# Patient Record
Sex: MALE | NOT HISPANIC OR LATINO | Employment: FULL TIME | URBAN - METROPOLITAN AREA
[De-identification: names, ages, dates, MRNs, and addresses within clinical notes are randomized per-mention and may not be internally consistent; named-entity substitution may affect disease eponyms.]

---

## 2023-02-16 ENCOUNTER — TELEMEDICINE (OUTPATIENT)
Dept: FAMILY MEDICINE CLINIC | Facility: TELEHEALTH | Age: 28
End: 2023-02-16
Payer: COMMERCIAL

## 2023-02-16 VITALS — TEMPERATURE: 98.2 F

## 2023-02-16 DIAGNOSIS — J22 LOWER RESPIRATORY INFECTION (E.G., BRONCHITIS, PNEUMONIA, PNEUMONITIS, PULMONITIS): Primary | ICD-10-CM

## 2023-02-16 PROBLEM — R29.898 LOWER EXTREMITY WEAKNESS: Status: ACTIVE | Noted: 2021-02-03

## 2023-02-16 PROBLEM — S69.91XA INJURY OF RIGHT WRIST: Status: ACTIVE | Noted: 2018-01-24

## 2023-02-16 PROCEDURE — 99203 OFFICE O/P NEW LOW 30 MIN: CPT | Performed by: NURSE PRACTITIONER

## 2023-02-16 RX ORDER — AZITHROMYCIN 250 MG/1
TABLET, FILM COATED ORAL
Qty: 6 TABLET | Refills: 0 | Status: SHIPPED | OUTPATIENT
Start: 2023-02-16

## 2023-02-16 RX ORDER — ALBUTEROL SULFATE 90 UG/1
2 AEROSOL, METERED RESPIRATORY (INHALATION) EVERY 4 HOURS PRN
Qty: 18 G | Refills: 0 | Status: SHIPPED | OUTPATIENT
Start: 2023-02-16 | End: 2023-02-26

## 2023-02-16 RX ORDER — BROMPHENIRAMINE MALEATE, PSEUDOEPHEDRINE HYDROCHLORIDE, AND DEXTROMETHORPHAN HYDROBROMIDE 2; 30; 10 MG/5ML; MG/5ML; MG/5ML
5 SYRUP ORAL 4 TIMES DAILY PRN
Qty: 140 ML | Refills: 0 | Status: SHIPPED | OUTPATIENT
Start: 2023-02-16 | End: 2023-02-23

## 2023-02-17 NOTE — PROGRESS NOTES
You have chosen to receive care through a telehealth visit.  Do you consent to use a video/audio connection for your medical care today? Yes     CHIEF COMPLAINT  Chief Complaint   Patient presents with   • Cough         HPI  Juan C Eason is a 28 y.o. male  presents with complaint of cough and mild SOA. Reports he has a history of chronic bronchitis in the past. Reports he needs a refill of his inhaler since the one he has that is out of date. No fever or chills. No nausea or vomiting. Mild SOA no CP. Reports he is coughing up some yellow secretions. Reports he has taken mucinex for his symptoms. Reports his symptoms started 1.5 week ago.     Review of Systems   Constitutional: Negative for chills, fatigue and fever.   HENT: Positive for congestion. Negative for ear discharge, ear pain, sinus pressure, sinus pain and sore throat.    Respiratory: Positive for cough and shortness of breath. Negative for chest tightness and wheezing.         Mild SOA   Cardiovascular: Negative for chest pain.   Gastrointestinal: Negative for abdominal pain, diarrhea, nausea and vomiting.   Musculoskeletal: Negative for back pain and myalgias.   Neurological: Negative for dizziness and headaches.   Psychiatric/Behavioral: Negative.        Past Medical History:   Diagnosis Date   • GERD (gastroesophageal reflux disease)    • Hypertension        No family history on file.    Social History     Socioeconomic History   • Marital status:    Tobacco Use   • Smoking status: Never   • Smokeless tobacco: Current     Types: Chew       Juan C Eason  reports that he has never smoked. His smokeless tobacco use includes chew.. I have educated him on the risk of diseases from using tobacco products such as cancer, COPD and heart disease.     Reports he is going to try to stop chewing tobacco. Reports he will discuss with his PCP     I spent 1 minutes counseling the patient.              Temp 98.2 °F (36.8 °C) (Oral)     PHYSICAL  EXAM  Physical Exam   Constitutional: He is oriented to person, place, and time. He appears well-developed and well-nourished. No distress.   HENT:   Head: Normocephalic and atraumatic.   Right Ear: Hearing normal. No drainage.   Left Ear: Hearing normal. No drainage.   Nose: Right sinus exhibits no maxillary sinus tenderness. Left sinus exhibits no maxillary sinus tenderness.   Mouth/Throat: Mouth/Lips are normal.Oropharynx is clear and moist.   Patient directed exam   Eyes: Conjunctivae and lids are normal.   Pulmonary/Chest: Effort normal.  No respiratory distress.  Lymphadenopathy:        Right cervical: No anterior cervical adenopathy present.       Left cervical: No anterior cervical adenopathy present.   Neurological: He is alert and oriented to person, place, and time.   Psychiatric: He has a normal mood and affect. His speech is normal and behavior is normal.       No results found for this or any previous visit.    Diagnoses and all orders for this visit:    1. Lower respiratory infection (e.g., bronchitis, pneumonia, pneumonitis, pulmonitis) (Primary)    Other orders  -     azithromycin (Zithromax Z-Ty) 250 MG tablet; Take 2 tablets by mouth on day 1, then 1 tablet daily on days 2-5  Dispense: 6 tablet; Refill: 0  -     albuterol sulfate  (90 Base) MCG/ACT inhaler; Inhale 2 puffs Every 4 (Four) Hours As Needed for Wheezing or Shortness of Air for up to 10 days.  Dispense: 18 g; Refill: 0  -     brompheniramine-pseudoephedrine-DM 30-2-10 MG/5ML syrup; Take 5 mL by mouth 4 (Four) Times a Day As Needed for Congestion, Cough or Allergies for up to 7 days.  Dispense: 140 mL; Refill: 0    rest  Fluids  Declines steroid  PCP if symptoms persist  ER for worsening symptoms such as high fever, chest pain or SOA      FOLLOW-UP  As discussed during visit with PCP/[facility] if no improvement or Urgent Care/Emergency Department if worsening of symptoms    Patient verbalizes understanding of medication  dosage, comfort measures, instructions for treatment and follow-up.    IvySally Nguyen, APRN  02/16/2023  22:27 EST    The use of a video visit has been reviewed with the patient and verbal informed consent has been obtained. Myself and Juan C Eason participated in this visit. The patient is located in [address] [place] [address] [place] [facility].    I am located in [place], [place]. My[employer] and [employer] were utilized. I spent 5 minutes in the patient's chart for this visit.

## 2023-10-03 ENCOUNTER — OFFICE VISIT (OUTPATIENT)
Dept: FAMILY MEDICINE CLINIC | Facility: CLINIC | Age: 28
End: 2023-10-03
Payer: COMMERCIAL

## 2023-10-03 VITALS
SYSTOLIC BLOOD PRESSURE: 144 MMHG | OXYGEN SATURATION: 98 % | HEIGHT: 74 IN | RESPIRATION RATE: 16 BRPM | WEIGHT: 287.2 LBS | BODY MASS INDEX: 36.86 KG/M2 | HEART RATE: 114 BPM | DIASTOLIC BLOOD PRESSURE: 98 MMHG

## 2023-10-03 DIAGNOSIS — Z00.00 ANNUAL PHYSICAL EXAM: Primary | ICD-10-CM

## 2023-10-03 DIAGNOSIS — I10 PRIMARY HYPERTENSION: ICD-10-CM

## 2023-10-03 PROCEDURE — 84443 ASSAY THYROID STIM HORMONE: CPT | Performed by: STUDENT IN AN ORGANIZED HEALTH CARE EDUCATION/TRAINING PROGRAM

## 2023-10-03 PROCEDURE — 80053 COMPREHEN METABOLIC PANEL: CPT | Performed by: STUDENT IN AN ORGANIZED HEALTH CARE EDUCATION/TRAINING PROGRAM

## 2023-10-03 PROCEDURE — 83036 HEMOGLOBIN GLYCOSYLATED A1C: CPT | Performed by: STUDENT IN AN ORGANIZED HEALTH CARE EDUCATION/TRAINING PROGRAM

## 2023-10-03 PROCEDURE — 36415 COLL VENOUS BLD VENIPUNCTURE: CPT | Performed by: STUDENT IN AN ORGANIZED HEALTH CARE EDUCATION/TRAINING PROGRAM

## 2023-10-03 PROCEDURE — 85027 COMPLETE CBC AUTOMATED: CPT | Performed by: STUDENT IN AN ORGANIZED HEALTH CARE EDUCATION/TRAINING PROGRAM

## 2023-10-03 PROCEDURE — 80061 LIPID PANEL: CPT | Performed by: STUDENT IN AN ORGANIZED HEALTH CARE EDUCATION/TRAINING PROGRAM

## 2023-10-03 RX ORDER — AMLODIPINE BESYLATE 5 MG/1
5 TABLET ORAL DAILY
Qty: 30 TABLET | Refills: 3 | Status: SHIPPED | OUTPATIENT
Start: 2023-10-03

## 2023-10-03 RX ORDER — OMEPRAZOLE 20 MG/1
20 CAPSULE, DELAYED RELEASE ORAL DAILY
COMMUNITY

## 2023-10-03 NOTE — PROGRESS NOTES
"Chief Complaint  Chief Complaint   Patient presents with    Hypertension     Patient was seen at  recently for GI symptoms and BP was 160/93.  Denies headaches.     Subjective        Juan C Eason is a 28 y.o. male who presents to [facility].    History of Present Illness  Here to establish care with me.  Seen in urgent care on 9/25 for fever, N/V/D.  His BP at that time was 157/127, 160/93.  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    He was on BP medication in 2016 / 2017 but not on it since.    Both father and mother with HTN.  Father with diabetes.    Does have some shortness of breath during allergy season.    Does not have frequent urination.    He does have frequent thirst.      Diet: eats a lot of salty foods.  He drinks a lot of water, up to a gallon per day.  He has stopped drinking energy drinks.  Maybe 1 soda per day.      Exercise: job is very physically active.      Last regular PCP visit was probably when he was in high school, maybe 10 yrs ago or so.    He did see GI in 2016 for stomach issues.      Heart rate is consistently in low 100s.  No palpitations.      Objective   /98   Pulse 114   Resp 16   Ht 188 cm (74\")   Wt 130 kg (287 lb 3.2 oz)   SpO2 98%   BMI 36.87 kg/m²     Estimated body mass index is 36.87 kg/m² as calculated from the following:    Height as of this encounter: 188 cm (74\").    Weight as of this encounter: 130 kg (287 lb 3.2 oz).     Physical Exam   GEN: In no acute distress, non toxic appearing  HEENT: Pupils equal and reactive to light, sclera clear. Mucous membranes moist. Oropharynx without erythema or exudate. No cervical or submandibular lymphadenopathy.  CV: Regular rate and rhythm, no murmurs, 2+ peripheral pulses, No extremity edema.   RESP: Lungs clear to auscultation anteriorly and posteriorly in all lung fields bilaterally.  NEURO: AAO to person, place, and time. CN 2-12 intact grossly.    PHQ-2 " Depression Screening  Little interest or pleasure in doing things? 0-->not at all   Feeling down, depressed, or hopeless? 0-->not at all   PHQ-2 Total Score 0      Result Review :              Assessment and Plan     Diagnoses and all orders for this visit:    1. Annual physical exam (Primary)  Labs as below for screening purposes.  He reports history of elevated liver enzymes - check CMP.  Family history of diabetes and frequent thirst - check A1C.  HTN - check lipid panel for risk stratification.  HTN and tachycardia - check TSH.  Recommend healthy diet with plenty of fruits, vegetables, water intake, low sodium.    Recommend regular exercise.  Colon ca screening at 45.  Prostate ca screening at 50.    Next physical in 1 yr.    -     CBC (No Diff)  -     Comprehensive Metabolic Panel  -     Hemoglobin A1c  -     Lipid Panel  -     TSH    2. Primary hypertension  Discussed low sodium diet, weight loss, regular cardiovascular exercise.  Start amlodipine 5 mg daily.  Check TSH as above given HTN and tachycardia.    Recommend ambulatory BP monitoring.    -     Comprehensive Metabolic Panel  -     amLODIPine (NORVASC) 5 MG tablet; Take 1 tablet by mouth Daily.  Dispense: 30 tablet; Refill: 3       Follow Up     Return in about 2 months (around 12/3/2023) for HTN check .

## 2023-10-04 LAB
ALBUMIN SERPL-MCNC: 4.6 G/DL (ref 3.5–5.2)
ALBUMIN/GLOB SERPL: 1.6 G/DL
ALP SERPL-CCNC: 50 U/L (ref 39–117)
ALT SERPL W P-5'-P-CCNC: 96 U/L (ref 1–41)
ANION GAP SERPL CALCULATED.3IONS-SCNC: 12.4 MMOL/L (ref 5–15)
AST SERPL-CCNC: 47 U/L (ref 1–40)
BILIRUB SERPL-MCNC: 0.7 MG/DL (ref 0–1.2)
BUN SERPL-MCNC: 10 MG/DL (ref 6–20)
BUN/CREAT SERPL: 8.5 (ref 7–25)
CALCIUM SPEC-SCNC: 10 MG/DL (ref 8.6–10.5)
CHLORIDE SERPL-SCNC: 102 MMOL/L (ref 98–107)
CHOLEST SERPL-MCNC: 222 MG/DL (ref 0–200)
CO2 SERPL-SCNC: 26.6 MMOL/L (ref 22–29)
CREAT SERPL-MCNC: 1.18 MG/DL (ref 0.76–1.27)
DEPRECATED RDW RBC AUTO: 41.4 FL (ref 37–54)
EGFRCR SERPLBLD CKD-EPI 2021: 86.2 ML/MIN/1.73
ERYTHROCYTE [DISTWIDTH] IN BLOOD BY AUTOMATED COUNT: 12.4 % (ref 12.3–15.4)
GLOBULIN UR ELPH-MCNC: 2.8 GM/DL
GLUCOSE SERPL-MCNC: 96 MG/DL (ref 65–99)
HBA1C MFR BLD: 5.9 % (ref 4.8–5.6)
HCT VFR BLD AUTO: 50.5 % (ref 37.5–51)
HDLC SERPL-MCNC: 42 MG/DL (ref 40–60)
HGB BLD-MCNC: 17.7 G/DL (ref 13–17.7)
LDLC SERPL CALC-MCNC: 146 MG/DL (ref 0–100)
LDLC/HDLC SERPL: 3.39 {RATIO}
MCH RBC QN AUTO: 32.5 PG (ref 26.6–33)
MCHC RBC AUTO-ENTMCNC: 35 G/DL (ref 31.5–35.7)
MCV RBC AUTO: 92.7 FL (ref 79–97)
PLATELET # BLD AUTO: 321 10*3/MM3 (ref 140–450)
PMV BLD AUTO: 9.4 FL (ref 6–12)
POTASSIUM SERPL-SCNC: 4 MMOL/L (ref 3.5–5.2)
PROT SERPL-MCNC: 7.4 G/DL (ref 6–8.5)
RBC # BLD AUTO: 5.45 10*6/MM3 (ref 4.14–5.8)
SODIUM SERPL-SCNC: 141 MMOL/L (ref 136–145)
TRIGL SERPL-MCNC: 188 MG/DL (ref 0–150)
TSH SERPL DL<=0.05 MIU/L-ACNC: 2.16 UIU/ML (ref 0.27–4.2)
VLDLC SERPL-MCNC: 34 MG/DL (ref 5–40)
WBC NRBC COR # BLD: 10.14 10*3/MM3 (ref 3.4–10.8)

## 2023-10-05 ENCOUNTER — TELEPHONE (OUTPATIENT)
Dept: FAMILY MEDICINE CLINIC | Facility: CLINIC | Age: 28
End: 2023-10-05
Payer: COMMERCIAL

## 2023-10-05 NOTE — TELEPHONE ENCOUNTER
Hub to share. Tried to call patient, no VM.   ----- Message from Kishore Nolen,  sent at 10/4/2023  3:43 PM EDT -----  Please let Juan C know the following regarding recent labs: his kidney function, thyroid function, and blood counts were all normal.  His liver enzymes were elevated and cholesterol was elevated.  I would recommend cutting back on fried / greasy / oily foods, and trying to get some cardiovascular exercise (such as walking) on days he does not work.  His A1C which is the screening test for diabetes and is the average of his blood sugar over the last 3 months came back in the PRE diabetic range, meaning he has increased risk of progression to diabetes.  For this, exercise as above as well as minimizing sugars, sweets, sodas, and white carbs (breads, rices, pastas) will help bring this down.  We should recheck these labs in about 6 months or so to make sure they are improving.

## 2023-12-17 ENCOUNTER — TELEPHONE (OUTPATIENT)
Dept: URGENT CARE | Facility: CLINIC | Age: 28
End: 2023-12-17
Payer: COMMERCIAL

## 2023-12-17 DIAGNOSIS — R05.9 COUGH, UNSPECIFIED TYPE: Primary | ICD-10-CM

## 2023-12-17 RX ORDER — ALBUTEROL SULFATE 90 UG/1
2 AEROSOL, METERED RESPIRATORY (INHALATION) EVERY 4 HOURS PRN
Qty: 18 G | Refills: 0 | OUTPATIENT
Start: 2023-12-17

## 2023-12-17 NOTE — TELEPHONE ENCOUNTER
Patient here with daughter and requested a refill on albuterol inhaler.  Has a mild cough and has run out of his.

## 2024-12-07 ENCOUNTER — HOSPITAL ENCOUNTER (EMERGENCY)
Facility: HOSPITAL | Age: 29
Discharge: HOME | End: 2024-12-07
Attending: EMERGENCY MEDICINE
Payer: COMMERCIAL

## 2024-12-07 ENCOUNTER — APPOINTMENT (OUTPATIENT)
Dept: CT IMAGING | Facility: HOSPITAL | Age: 29
End: 2024-12-07
Payer: COMMERCIAL

## 2024-12-07 VITALS
HEIGHT: 73 IN | OXYGEN SATURATION: 97 % | BODY MASS INDEX: 35.74 KG/M2 | DIASTOLIC BLOOD PRESSURE: 106 MMHG | SYSTOLIC BLOOD PRESSURE: 158 MMHG | TEMPERATURE: 98.5 F | WEIGHT: 269.7 LBS | HEART RATE: 103 BPM | RESPIRATION RATE: 19 BRPM

## 2024-12-07 DIAGNOSIS — S00.01XA ABRASION OF SCALP, INITIAL ENCOUNTER: ICD-10-CM
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
## 2024-12-07 PROCEDURE — 71250 CT THORAX DX C-: CPT

## 2024-12-07 PROCEDURE — 70450 CT HEAD/BRAIN W/O DYE: CPT

## 2024-12-07 PROCEDURE — 25010000002 TETANUS-DIPHTH-ACELL PERTUSSIS 5-2.5-18.5 LF-MCG/0.5 SUSPENSION PREFILLED SYRINGE

## 2024-12-07 PROCEDURE — 72125 CT NECK SPINE W/O DYE: CPT

## 2024-12-07 PROCEDURE — 90715 TDAP VACCINE 7 YRS/> IM: CPT

## 2024-12-07 PROCEDURE — 99284 EMERGENCY DEPT VISIT MOD MDM: CPT

## 2024-12-07 PROCEDURE — 90471 IMMUNIZATION ADMIN: CPT

## 2024-12-07 RX ORDER — ALBUTEROL SULFATE 90 UG/1
2 INHALANT RESPIRATORY (INHALATION) EVERY 4 HOURS PRN
Qty: 18 G | Refills: 0 | Status: SHIPPED | OUTPATIENT
Start: 2024-12-07

## 2024-12-07 RX ADMIN — TETANUS TOXOID, REDUCED DIPHTHERIA TOXOID AND ACELLULAR PERTUSSIS VACCINE, ADSORBED 0.5 ML: 5; 2.5; 8; 8; 2.5 SUSPENSION INTRAMUSCULAR at 17:44

## 2024-12-07 NOTE — Clinical Note
[facility] [place] [address] [place] [address] [place] [address]  Phone: [phone]    Juan C Eason was seen and treated in our emergency department on 12/7/2024.  He may return to work on 12/09/2024.         Thank you for choosing [facility].    Chandan Andrea Jr., EDYTA

## 2024-12-07 NOTE — FSED PROVIDER NOTE
Subjective   History of Present Illness  29-year-old male reports that he was at his home reports that he was on the rough trying to get off his roof onto a ladder when the ladder gave way and he slipped and fell.  He reports that his home is a single-story home.  He reports that he fell from the ladder onto a grill and then onto the ground.  He denies having loss of consciousness.  He reports that he did sustain a cut to his scalp.  He is reporting that he needs his tetanus updated.  He is here with his wife who reports that he is at his mental baseline.  Patient reports that he had approximately 6 beers prior to fall.  Patient is not reporting any pain to his abdomen pelvis lower extremities upper extremities he reports mild tenderness to the upper sternal chest wall region.  He is not reporting any pain to his neck or head, does not want any pain medication.        Review of Systems   All other systems reviewed and are negative.      Past Medical History:   Diagnosis Date    GERD (gastroesophageal reflux disease)     Hypertension     Low back pain 2018       No Known Allergies    Past Surgical History:   Procedure Laterality Date    COLONOSCOPY  2016    Internal Hemorroid    FINGER SURGERY Left     WISDOM TOOTH EXTRACTION         Family History   Problem Relation Age of Onset    Hyperlipidemia Mother     Alcohol abuse Father     Diabetes Father     Hyperlipidemia Father        Social History     Socioeconomic History    Marital status:    Tobacco Use    Smoking status: Never     Passive exposure: Never    Smokeless tobacco: Current     Types: Chew   Vaping Use    Vaping status: Never Used   Substance and Sexual Activity    Alcohol use: Yes     Alcohol/week: 12.0 standard drinks of alcohol     Types: 12 Cans of beer per week     Comment: 2-3 drinks a week    Drug use: Never    Sexual activity: Yes     Partners: Female     Birth control/protection: Birth control pill           Objective   Physical  Exam  Vitals and nursing note reviewed.   Constitutional:       General: He is not in acute distress.     Appearance: Normal appearance. He is not toxic-appearing.   HENT:      Head: Normocephalic.      Comments: Patient has multiple superficial abrasions to his scalp.  No active bleeding.     Right Ear: Ear canal and external ear normal.      Left Ear: Ear canal and external ear normal.      Ears:      Comments: No evidence of blood to either ear canal      Nose: Nose normal.      Mouth/Throat:      Mouth: Mucous membranes are moist.      Pharynx: Oropharynx is clear.   Eyes:      Extraocular Movements: Extraocular movements intact.      Conjunctiva/sclera: Conjunctivae normal.      Pupils: Pupils are equal, round, and reactive to light.   Cardiovascular:      Rate and Rhythm: Normal rate.      Pulses: Normal pulses.   Pulmonary:      Effort: Pulmonary effort is normal. No respiratory distress.      Breath sounds: Normal breath sounds. No wheezing or rhonchi.      Comments: Reproducible tenderness upper sternum  Abdominal:      General: Abdomen is flat. There is no distension.      Palpations: Abdomen is soft.      Tenderness: There is no abdominal tenderness. There is no right CVA tenderness, left CVA tenderness, guarding or rebound.   Musculoskeletal:         General: Normal range of motion.      Cervical back: Normal range of motion and neck supple. No rigidity or tenderness.   Skin:     General: Skin is warm.      Capillary Refill: Capillary refill takes less than 2 seconds.      Comments: 0.5 cm superficial laceration to the occipital region, midline   Neurological:      General: No focal deficit present.      Mental Status: He is alert and oriented to person, place, and time. Mental status is at baseline.         Laceration Repair    Date/Time: 12/7/2024 6:42 PM    Performed by: Chandan Andrea Jr., APRN  Authorized by: Izabella Hernandez MD    Consent:     Consent obtained:  Verbal    Consent given by:   Patient  Universal protocol:     Patient identity confirmed:  Verbally with patient and arm band  Laceration details:     Location:  Scalp    Length (cm):  0.5  Treatment:     Area cleansed with:  Soap and water and Shur-Clens    Amount of cleaning:  Standard  Skin repair:     Repair method:  Tissue adhesive  Approximation:     Approximation:  Close  Repair type:     Repair type:  Simple  Comments:      Surgical adhesive was applied to the superficial scalp laceration.             ED Course  ED Course as of 12/07/24 1900   Sat Dec 07, 2024   1753 CT scan head brain without contrast  Impression:  1.No acute intracranial process or calvarial fracture identified.  2.Moderate paranasal mucosal disease.      [WF]   1801 Cervical spine CT noncontrast  Impression:  No acute fracture or traumatic malalignment identified.   [WF]   1841 CT scan chest without contrast  Impression:     Unfortunately this examination is motion-degraded despite repeated attempts, which notably affects the sternum, making assessment for subtle sternal fractures unreliable. No acute pleural-parenchymal findings.   [WF]      ED Course User Index  [WF] Chandan Andrea Jr., APRN                                           Medical Decision Making  Patient does not appear to be in any acute distress he is smiling and laughing about his fall from his rough    CT scan head brain and cervical spine without contrast has been ordered I have added a noncontrast CT scan of the patient's chest    Will update patient's Boostrix    Will reexamine patient's wounds which at this time appear to be fairly superficial and there is no active bleeding    CT scan head neck and chest without contrast are negative for acute finding    Updated the patient he is asking that I discharge him home with albuterol inhaler which we will be happy to do.  The superficial abrasion to his scalp has been treated with small amount of surgical adhesive.    Wife and patient are agreeable  to plan of care discharge    Problems Addressed:  Abrasion of scalp, initial encounter: complicated acute illness or injury  Fall, initial encounter: complicated acute illness or injury    Amount and/or Complexity of Data Reviewed  Radiology: ordered.    Risk  Prescription drug management.        Final diagnoses:   Fall, initial encounter   Abrasion of scalp, initial encounter       ED Disposition  ED Disposition       ED Disposition   Discharge    Condition   Stable    Comment   --               Kishore Nolen, DO  [address] [facility] [address]  [address]  [place] [address]  [phone]               Medication List        Changed      * albuterol sulfate  (90 Base) MCG/ACT inhaler  Commonly known as: PROVENTIL HFA;VENTOLIN HFA;PROAIR HFA  Inhale 2 puffs Every 4 (Four) Hours As Needed for Wheezing or Shortness of Air.  What changed: Another medication with the same name was added. Make sure you understand how and when to take each.     * albuterol sulfate  (90 Base) MCG/ACT inhaler  Commonly known as: PROVENTIL HFA;VENTOLIN HFA;PROAIR HFA  Inhale 2 puffs Every 4 (Four) Hours As Needed for Wheezing.  What changed: You were already taking a medication with the same name, and this prescription was added. Make sure you understand how and when to take each.           * This list has 2 medication(s) that are the same as other medications prescribed for you. Read the directions carefully, and ask your doctor or other care provider to review them with you.                   Where to Get Your Medications        These medications were sent to [id number] - [place], [place] - [address] [facility] [address]  - [phone]  - [phone]   [address] [place] [address]      Phone: [phone]   albuterol sulfate  (90 Base) MCG/ACT inhaler

## 2024-12-07 NOTE — DISCHARGE INSTRUCTIONS
Tylenol ibuprofen as needed for body aches    Recommend application of heat to the muscles of your back and neck to prevent muscle spasm    The glue applied to the superficial abrasion of your scalp should dry up and follow-up within 5 to 7-day    For signs of head injury including mental status change persistent vomiting bleeding from the nose hairs present to the nearest ER    Follow-up with your family doctor as needed return to ER for worsening symptoms

## 2025-02-07 ENCOUNTER — TELEMEDICINE (OUTPATIENT)
Dept: FAMILY MEDICINE CLINIC | Facility: TELEHEALTH | Age: 30
End: 2025-02-07
Payer: COMMERCIAL

## 2025-02-07 DIAGNOSIS — J22 LOWER RESPIRATORY INFECTION (E.G., BRONCHITIS, PNEUMONIA, PNEUMONITIS, PULMONITIS): Primary | ICD-10-CM

## 2025-02-07 RX ORDER — AZITHROMYCIN 250 MG/1
TABLET, FILM COATED ORAL
Qty: 6 TABLET | Refills: 0 | Status: SHIPPED | OUTPATIENT
Start: 2025-02-07

## 2025-02-07 RX ORDER — BENZONATATE 200 MG/1
200 CAPSULE ORAL 3 TIMES DAILY PRN
Qty: 21 CAPSULE | Refills: 0 | Status: SHIPPED | OUTPATIENT
Start: 2025-02-07

## 2025-02-07 RX ORDER — ALBUTEROL SULFATE 90 UG/1
2 INHALANT RESPIRATORY (INHALATION) EVERY 4 HOURS PRN
Qty: 18 G | Refills: 0 | Status: SHIPPED | OUTPATIENT
Start: 2025-02-07

## 2025-02-07 RX ORDER — PREDNISONE 10 MG/1
TABLET ORAL
Qty: 21 TABLET | Refills: 0 | Status: SHIPPED | OUTPATIENT
Start: 2025-02-07

## 2025-02-07 NOTE — LETTER
February 7, 2025     Patient: Juan C Eason   YOB: 1995   Date of Visit: 2/7/2025       To Whom It May Concern:    It is my medical opinion that Juan C Eason may return to work on Monday, February 10, 2025.  Please excuse his absence from work on Friday, February 7, 2025 due to illness.             Sincerely,        EDYTA Gentile    CC: No Recipients

## 2025-02-07 NOTE — PROGRESS NOTES
You have chosen to receive care through a telehealth visit.  Do you consent to use a video/audio connection for your medical care today? Yes     Patient or patient representative verbalized consent for the use of Ambient Listening during the visit with  EDYTA Gentile for chart documentation. 2/7/2025  16:11 EST    CHIEF COMPLAINT  No chief complaint on file.        HPI  History of Present Illness  The patient is a 30-year-old male presenting with complaints of cough.    He has been experiencing a persistent cough since Monday night, characterized by the production of yellowish-green sputum in the early morning and a dry cough throughout the day. He also reports wheezing following severe coughing episodes and shortness of breath. He recalls a similar episode last year, during which he was advised to consult his primary care physician for potential asthma, a recommendation he has yet to follow. He mentions a recent exposure to a colleague with a lung infection, raising concerns about possible transmission. He sought medical attention at an urgent care clinic but left due to the long wait time. He has a known susceptibility to bronchitis and is seeking over-the-counter remedies for lightheadedness associated with his cough. His employer sent him home early today due to his symptoms, and he is requesting a work note. He works from Monday through Friday. He has been using an albuterol inhaler from a previous prescription.    SOCIAL HISTORY  He works from Monday through Friday.    ALLERGIES  The patient is allergic to MUCINEX DM.    MEDICATIONS  Current: Albuterol inhaler.       Review of Systems  See HPI    Past Medical History:   Diagnosis Date    GERD (gastroesophageal reflux disease)     Hypertension     Low back pain 2018       Family History   Problem Relation Age of Onset    Hyperlipidemia Mother     Alcohol abuse Father     Diabetes Father     Hyperlipidemia Father        Social History     Socioeconomic  History    Marital status:    Tobacco Use    Smoking status: Never     Passive exposure: Never    Smokeless tobacco: Current     Types: Chew   Vaping Use    Vaping status: Never Used   Substance and Sexual Activity    Alcohol use: Yes     Alcohol/week: 12.0 standard drinks of alcohol     Types: 12 Cans of beer per week     Comment: 2-3 drinks a week    Drug use: Never    Sexual activity: Yes     Partners: Female     Birth control/protection: Birth control pill       Juan C Eason  reports that he has never smoked. He has never been exposed to tobacco smoke. His smokeless tobacco use includes chew.             There were no vitals taken for this visit.    PHYSICAL EXAM  Physical Exam   Constitutional: He is oriented to person, place, and time. He appears well-developed and well-nourished. He does not have a sickly appearance. He does not appear ill.   HENT:   Head: Normocephalic and atraumatic.   Pulmonary/Chest: Effort normal.  No respiratory distress (persistent cough during visit).  Neurological: He is alert and oriented to person, place, and time.           Diagnoses and all orders for this visit:    1. Lower respiratory infection (e.g., bronchitis, pneumonia, pneumonitis, pulmonitis) (Primary)  -     azithromycin (Zithromax Z-Ty) 250 MG tablet; Take 2 tablets by mouth on day 1, then 1 tablet daily on days 2-5  Dispense: 6 tablet; Refill: 0  -     predniSONE (DELTASONE) 10 MG (21) dose pack; Use as directed on package  Dispense: 21 tablet; Refill: 0  -     albuterol sulfate  (90 Base) MCG/ACT inhaler; Inhale 2 puffs Every 4 (Four) Hours As Needed for Wheezing or Shortness of Air (cough).  Dispense: 18 g; Refill: 0  -     benzonatate (TESSALON) 200 MG capsule; Take 1 capsule by mouth 3 (Three) Times a Day As Needed for Cough.  Dispense: 21 capsule; Refill: 0    --take medications as prescribed  --increase fluids, rest as needed, tylenol or ibuprofen for pain  --f/u in 3-5 days if no  improvement      Assessment & Plan  1. Cough.  He has been exposed to a respiratory infection. A comprehensive treatment plan has been initiated, which includes a Z-Ty (azithromycin), a steroid pack (prednisone), an albuterol inhaler, and Tessalon Perles. The Z-Ty and prednisone should be taken as per the instructions provided on the packaging. The albuterol inhaler is to be used for 2 puffs every 4 hours as needed for wheezing, shortness of breath, or cough. Tessalon Perles should be taken one every 8 hours as needed for cough. All prescriptions have been sent to  pharmacy. A work note has been issued and sent [facility] [employer] [facility] the tab for letters.         FOLLOW-UP  As discussed during visit with PCP/Virtual Care if no improvement or Urgent Care/Emergency Department if worsening of symptoms    Patient verbalizes understanding of medication dosage, comfort measures, instructions for treatment and follow-up.    Saba Cunningham, APRN  02/07/2025  16:11 EST    Mode of Visit: Video  Location of patient: -HOME-  Location of provider: +HOME+  You have chosen to receive care through a telehealth visit.  The patient has signed the video visit consent form.  The visit included audio and video interaction. No technical issues occurred during this visit.    The use of a video visit has been reviewed with the patient and verbal informed consent has been obtained. Myself and Juan C Eason     participated in this visit. The patient is located in [address] [place] [address] [place] [facility]  I am located in [place], [place]. [employer] and [employer] Video [employer] were utilized. I spent 8 minutes in the patient's chart for this visit.      Note Disclaimer: At [facility], we believe that sharing information builds trust and better   relationships. You are receiving this note because you recently visited [facility]. It is possible you   will see health information before a provider has talked with you about it. This kind of information can    be easy to misunderstand. To help you fully understand what it means for your health, we urge you to   discuss this note with your provider.

## 2025-02-11 ENCOUNTER — OFFICE VISIT (OUTPATIENT)
Dept: FAMILY MEDICINE CLINIC | Facility: CLINIC | Age: 30
End: 2025-02-11
Payer: COMMERCIAL

## 2025-02-11 VITALS
DIASTOLIC BLOOD PRESSURE: 92 MMHG | WEIGHT: 274 LBS | OXYGEN SATURATION: 95 % | BODY MASS INDEX: 36.31 KG/M2 | SYSTOLIC BLOOD PRESSURE: 156 MMHG | HEART RATE: 119 BPM | RESPIRATION RATE: 18 BRPM | HEIGHT: 73 IN

## 2025-02-11 DIAGNOSIS — R05.1 ACUTE COUGH: Primary | ICD-10-CM

## 2025-02-11 DIAGNOSIS — J45.21 MILD INTERMITTENT REACTIVE AIRWAY DISEASE WITH ACUTE EXACERBATION: ICD-10-CM

## 2025-02-11 DIAGNOSIS — R05.1 ACUTE COUGH: ICD-10-CM

## 2025-02-11 PROCEDURE — 96372 THER/PROPH/DIAG INJ SC/IM: CPT | Performed by: STUDENT IN AN ORGANIZED HEALTH CARE EDUCATION/TRAINING PROGRAM

## 2025-02-11 PROCEDURE — 99214 OFFICE O/P EST MOD 30 MIN: CPT | Performed by: STUDENT IN AN ORGANIZED HEALTH CARE EDUCATION/TRAINING PROGRAM

## 2025-02-11 RX ORDER — MONTELUKAST SODIUM 10 MG/1
10 TABLET ORAL NIGHTLY
Qty: 30 TABLET | Refills: 1 | Status: SHIPPED | OUTPATIENT
Start: 2025-02-11 | End: 2025-02-11

## 2025-02-11 RX ORDER — TRIAMCINOLONE ACETONIDE 40 MG/ML
80 INJECTION, SUSPENSION INTRA-ARTICULAR; INTRAMUSCULAR ONCE
Status: COMPLETED | OUTPATIENT
Start: 2025-02-11 | End: 2025-02-11

## 2025-02-11 RX ORDER — MONTELUKAST SODIUM 10 MG/1
10 TABLET ORAL NIGHTLY
Qty: 90 TABLET | Refills: 1 | Status: SHIPPED | OUTPATIENT
Start: 2025-02-11

## 2025-02-11 RX ORDER — ALBUTEROL SULFATE AND BUDESONIDE 90; 80 UG/1; UG/1
2 AEROSOL, METERED RESPIRATORY (INHALATION) EVERY 6 HOURS PRN
Qty: 10.7 G | Refills: 2 | Status: SHIPPED | OUTPATIENT
Start: 2025-02-11

## 2025-02-11 RX ORDER — METHYLPREDNISOLONE SODIUM SUCCINATE 40 MG/ML
60 INJECTION INTRAMUSCULAR; INTRAVENOUS ONCE
Status: DISCONTINUED | OUTPATIENT
Start: 2025-02-11 | End: 2025-02-11

## 2025-02-11 RX ORDER — DOXYCYCLINE 100 MG/1
100 CAPSULE ORAL 2 TIMES DAILY
Qty: 14 CAPSULE | Refills: 0 | Status: CANCELLED | OUTPATIENT
Start: 2025-02-11 | End: 2025-02-18

## 2025-02-11 RX ADMIN — TRIAMCINOLONE ACETONIDE 80 MG: 40 INJECTION, SUSPENSION INTRA-ARTICULAR; INTRAMUSCULAR at 14:40

## 2025-02-11 NOTE — PROGRESS NOTES
"Chief Complaint  Chief Complaint   Patient presents with    Cough       Subjective        Juan C Eason is a 30 y.o. male who presents to [facility].  History of Present Illness    History of Present Illness  The patient is a 30-year-old male with a persistent, non-productive cough, more severe at night and in the morning, with relief between 9:00 AM and 12:00 PM. He experiences lightheadedness during intense coughing episodes and had a recent episode of chills without fever. DayQuil and cough drops have been ineffective. He has a history of recurrent bronchitis since high school, worsening with each episode. No shortness of breath, but deep inhalation or exhalation triggers a tickling sensation and cough. A telemedicine visit on 02/07/2025 resulted in a Z-Ty, prednisone dose pack, and Tessalon Perles, but symptoms have worsened. He has a few prednisone doses left and recently refilled his albuterol inhaler. Urgent care last year suggested ruling out asthma, which remains undiagnosed.     Objective   /92   Pulse 119   Resp 18   Ht 185.4 cm (73\")   Wt 124 kg (274 lb)   SpO2 95%   BMI 36.15 kg/m²     Estimated body mass index is 36.15 kg/m² as calculated from the following:    Height as of this encounter: 185.4 cm (73\").    Weight as of this encounter: 124 kg (274 lb).     Physical Exam   GEN: In no acute distress, non toxic appearing  HEENT: Pupils equal and reactive to light, sclera clear. Mucous membranes moist.   CV: Regular rate and rhythm  RESP: Lungs with wheezes in all lung fields.  Significant dry cough.  Audible wheezing even without stethoscope.  No IWOB    PHQ-2 Depression Screening  Little interest or pleasure in doing things? Not at all   Feeling down, depressed, or hopeless? Not at all   PHQ-2 Total Score 0      Result Review :              Assessment and Plan     Diagnoses and all orders for this visit:    1. Acute cough (Primary)  -     montelukast " (Singulair) 10 MG tablet; Take 1 tablet by mouth Every Night.  Dispense: 30 tablet; Refill: 1  -     Albuterol-Budesonide (Airsupra) 90-80 MCG/ACT aerosol; Inhale 2 puffs Every 6 (Six) Hours As Needed (shortness of breath).  Dispense: 10.7 g; Refill: 2  -     Discontinue: methylPREDNISolone sodium succinate (SOLU-Medrol) injection 60 mg  -     triamcinolone acetonide (KENALOG-40) injection 80 mg    2. Mild intermittent reactive airway disease with acute exacerbation  -     montelukast (Singulair) 10 MG tablet; Take 1 tablet by mouth Every Night.  Dispense: 30 tablet; Refill: 1  -     Albuterol-Budesonide (Airsupra) 90-80 MCG/ACT aerosol; Inhale 2 puffs Every 6 (Six) Hours As Needed (shortness of breath).  Dispense: 10.7 g; Refill: 2  -     Complete PFT - Pre & Post Bronchodilator; Future  -     Discontinue: methylPREDNISolone sodium succinate (SOLU-Medrol) injection 60 mg  -     triamcinolone acetonide (KENALOG-40) injection 80 mg          Assessment & Plan  Persistent cough / RAD w/ exacerbation  - Symptoms suggest a possible asthma-like condition, not an infectious etiology  - Fever indicates potential viral infection, possibly triggering reactive airway disease or viral-induced wheezing  - Administer steroid injection today, kenalog 80 mg  - Prescribe airsupra: 2 puffs 3 times daily for a week, up to 4 times daily if needed  - Provided savings card for inhaler  - Prescribed Singulair nightly for a month   - Ordered pulmonary function test for possible asthma diagnosis     PROCEDURE  Administered steroid injection (kenalog 80 mg IM) in the office today.      Follow Up   If no improvement w/ above     Patient or patient representative verbalized consent for the use of Ambient Listening during the visit with  Kishore Nolen DO for chart documentation. 2/11/2025  14:59 EST

## 2025-02-26 ENCOUNTER — HOSPITAL ENCOUNTER (OUTPATIENT)
Dept: RESPIRATORY THERAPY | Facility: HOSPITAL | Age: 30
Discharge: HOME | End: 2025-02-26
Payer: COMMERCIAL

## 2025-02-26 VITALS — OXYGEN SATURATION: 100 %

## 2025-02-26 DIAGNOSIS — J45.21 MILD INTERMITTENT REACTIVE AIRWAY DISEASE WITH ACUTE EXACERBATION: ICD-10-CM

## 2025-02-26 PROCEDURE — 94760 N-INVAS EAR/PLS OXIMETRY 1: CPT

## 2025-02-26 PROCEDURE — 94664 DEMO&/EVAL PT USE INHALER: CPT

## 2025-02-26 PROCEDURE — 94727 GAS DIL/WSHOT DETER LNG VOL: CPT

## 2025-02-26 PROCEDURE — 94060 EVALUATION OF WHEEZING: CPT

## 2025-02-26 PROCEDURE — 94640 AIRWAY INHALATION TREATMENT: CPT

## 2025-02-26 PROCEDURE — 94799 UNLISTED PULMONARY SVC/PX: CPT

## 2025-02-26 PROCEDURE — 94729 DIFFUSING CAPACITY: CPT

## 2025-02-26 RX ORDER — ALBUTEROL SULFATE 90 UG/1
2 INHALANT RESPIRATORY (INHALATION) ONCE
Status: COMPLETED | OUTPATIENT
Start: 2025-02-26 | End: 2025-02-26

## 2025-02-26 RX ADMIN — ALBUTEROL SULFATE 2 PUFF: 108 AEROSOL, METERED RESPIRATORY (INHALATION) at 07:38

## 2025-04-29 ENCOUNTER — OFFICE VISIT (OUTPATIENT)
Dept: FAMILY MEDICINE CLINIC | Facility: CLINIC | Age: 30
End: 2025-04-29
Payer: COMMERCIAL

## 2025-04-29 VITALS
WEIGHT: 268 LBS | OXYGEN SATURATION: 99 % | SYSTOLIC BLOOD PRESSURE: 152 MMHG | BODY MASS INDEX: 35.52 KG/M2 | DIASTOLIC BLOOD PRESSURE: 103 MMHG | RESPIRATION RATE: 18 BRPM | HEART RATE: 115 BPM | HEIGHT: 73 IN

## 2025-04-29 DIAGNOSIS — G47.00 INSOMNIA, UNSPECIFIED TYPE: ICD-10-CM

## 2025-04-29 DIAGNOSIS — F41.9 ANXIETY: ICD-10-CM

## 2025-04-29 DIAGNOSIS — F10.90 ALCOHOL USE DISORDER: ICD-10-CM

## 2025-04-29 DIAGNOSIS — I10 PRIMARY HYPERTENSION: Primary | ICD-10-CM

## 2025-04-29 PROCEDURE — 99214 OFFICE O/P EST MOD 30 MIN: CPT | Performed by: STUDENT IN AN ORGANIZED HEALTH CARE EDUCATION/TRAINING PROGRAM

## 2025-04-29 RX ORDER — AMLODIPINE AND BENAZEPRIL HYDROCHLORIDE 10; 20 MG/1; MG/1
1 CAPSULE ORAL DAILY
Qty: 30 CAPSULE | Refills: 2 | Status: CANCELLED | OUTPATIENT
Start: 2025-04-29

## 2025-04-29 RX ORDER — NALTREXONE HYDROCHLORIDE 50 MG/1
50 TABLET, FILM COATED ORAL DAILY
Qty: 30 TABLET | Refills: 2 | Status: CANCELLED | OUTPATIENT
Start: 2025-04-29

## 2025-04-29 RX ORDER — AMLODIPINE AND BENAZEPRIL HYDROCHLORIDE 5; 10 MG/1; MG/1
1 CAPSULE ORAL DAILY
Qty: 30 CAPSULE | Refills: 3 | Status: SHIPPED | OUTPATIENT
Start: 2025-04-29

## 2025-04-29 NOTE — PROGRESS NOTES
"Chief Complaint  Chief Complaint   Patient presents with    Hypertension    Anxiety     Subjective        Juan C Eason is a 30 y.o. male who presents to [facility].    History of Present Illness    History of Present Illness  The patient is a 30-year-old male with multiple acute concerns.    Chief complaint: elevated blood pressure and sleep disturbances. 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 Home monitoring with a wrist device has been inconsistent. Minimal dietary intake due to lack of appetite, high salt content when eating. Active lifestyle through work. Current medication: amlodipine 5 mg, no history of other antihypertensives.     Reports night chills and shaking due to stress, depression, and anxiety. Suspected sleep apnea with snoring. Sleep disrupted by discomfort and throbbing sensation in the head. Ineffective management with melatonin, NyQuil, ZzzQuil, and regular sleep aids. No prescribed medications for anxiety or depression. History of using alcohol as a sleep aid, consuming six beers daily, abstinent for the past month.    Objective   BP (!) 152/103   Pulse 115   Resp 18   Ht 185.4 cm (73\")   Wt 122 kg (268 lb)   SpO2 99%   BMI 35.36 kg/m²     Estimated body mass index is 35.36 kg/m² as calculated from the following:    Height as of this encounter: 185.4 cm (73\").    Weight as of this encounter: 122 kg (268 lb).     Physical Exam   GEN: In no acute distress, non toxic appearing  HEENT: Pupils equal and reactive to light, sclera clear. Mucous membranes moist.   RESP: Lungs clear to auscultation anteriorly and posteriorly in all lung fields bilaterally.  NEURO: AAO to person, place, and time. CN 2-12 intact grossly.   PSYCH: Affect normal, insight fair      Result Review :              Assessment and Plan     Diagnoses and all orders for this visit:    1. Primary hypertension (Primary)  -     amLODIPine-benazepril (Lotrel) 5-10 MG per capsule; " Take 1 capsule by mouth Daily.  Dispense: 30 capsule; Refill: 3    2. Alcohol use disorder    3. Anxiety  -     amitriptyline (ELAVIL) 25 MG tablet; Take 1 tablet by mouth Every Night.  Dispense: 30 tablet; Refill: 3    4. Insomnia, unspecified type  -     amitriptyline (ELAVIL) 25 MG tablet; Take 1 tablet by mouth Every Night.  Dispense: 30 tablet; Refill: 3          Assessment & Plan  Hypertension:  - BP remains elevated at 152/103 on amlodipine.  - Start amlodipine 5 / benazepril 10 mg combo pill  - Discussed lifestyle modifications: reduce salt intake, increase water consumption, maintain physical activity.  - Recheck 4 wks     Insomnia:  - Difficulty sleeping likely related to stress, anxiety, and potential sleep apnea.  - Initiate amitriptyline 25 mg nightly for sleep and mood, to be taken an hour before bedtime.  - Consider sleep study if fatigue persists despite improved sleep.  - Discussed impact of improved sleep on overall well-being and BP.    Anxiety and depression:  - Amitriptyline as above to address anxiety and depression symptoms, in addition to sleep     Alcohol use disorder:  - History of using alcohol to aid sleep, recent reduction in intake.  - Discussed potential use of naltrexone if alcohol cravings persist.  - Focus on improving sleep and mood with amitriptyline as above         Follow Up     Return in about 4 weeks (around 5/27/2025) for BP / mood sleep .    Patient or patient representative verbalized consent for the use of Ambient Listening during the visit with  Kishore Nolen DO for chart documentation. 4/29/2025  08:40 EDT

## 2025-05-27 ENCOUNTER — OFFICE VISIT (OUTPATIENT)
Dept: FAMILY MEDICINE CLINIC | Facility: CLINIC | Age: 30
End: 2025-05-27
Payer: COMMERCIAL

## 2025-05-27 VITALS
HEIGHT: 73 IN | BODY MASS INDEX: 36.26 KG/M2 | HEART RATE: 116 BPM | OXYGEN SATURATION: 99 % | RESPIRATION RATE: 18 BRPM | DIASTOLIC BLOOD PRESSURE: 98 MMHG | SYSTOLIC BLOOD PRESSURE: 142 MMHG | WEIGHT: 273.6 LBS

## 2025-05-27 DIAGNOSIS — I10 PRIMARY HYPERTENSION: Primary | ICD-10-CM

## 2025-05-27 DIAGNOSIS — F10.90 ALCOHOL USE DISORDER: ICD-10-CM

## 2025-05-27 DIAGNOSIS — F41.9 ANXIETY: ICD-10-CM

## 2025-05-27 DIAGNOSIS — G47.00 INSOMNIA, UNSPECIFIED TYPE: ICD-10-CM

## 2025-05-27 PROCEDURE — 99214 OFFICE O/P EST MOD 30 MIN: CPT | Performed by: STUDENT IN AN ORGANIZED HEALTH CARE EDUCATION/TRAINING PROGRAM

## 2025-05-27 RX ORDER — AMLODIPINE AND BENAZEPRIL HYDROCHLORIDE 10; 20 MG/1; MG/1
1 CAPSULE ORAL DAILY
Qty: 30 CAPSULE | Refills: 5 | Status: SHIPPED | OUTPATIENT
Start: 2025-05-27

## 2025-05-27 NOTE — PROGRESS NOTES
"Chief Complaint  Chief Complaint   Patient presents with    Hypertension     4 week follow up     Subjective        Juan C Eason is a 30 y.o. male who presents to [facility].    History of Present Illness  Here for 4 wk f/u of HTN, started on amlodipine benazepril 5-10 mg daily.  Was also start on amitriptyline nightly 25 mg for anxiety / insomnia.   At his last visit we also discussed alcohol use.  He has only had 1-2 beers w/ dinner since our last appt.  His BP is still slightly elevated.  He has not checked it outside of the office.  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    Sleep has been better.  Was a little groggy initially but that has improved.  He is taking melatonin with the amitriptyline which is helpful.  Anxiety has been better as well.      Objective   /98   Pulse 116   Resp 18   Ht 185.4 cm (73\")   Wt 124 kg (273 lb 9.6 oz)   SpO2 99%   BMI 36.10 kg/m²     Estimated body mass index is 36.1 kg/m² as calculated from the following:    Height as of this encounter: 185.4 cm (73\").    Weight as of this encounter: 124 kg (273 lb 9.6 oz).     Physical Exam   GEN: In no acute distress, non toxic appearing  HEENT: Pupils equal and reactive to light, sclera clear. Mucous membranes moist.   NEURO: AAO to person, place, and time. CN 2-12 intact grossly.   PSYCH: Affect normal, insight fair      Result Review :              Assessment and Plan     Diagnoses and all orders for this visit:    1. Primary hypertension (Primary)  BP still not at goal today.  Increase amlodipine benazepril to 10-20 mg daily.  Check ambulatory BP and let me know if not < 140/90.    -     amLODIPine-benazepril (Lotrel) 10-20 MG per capsule; Take 1 capsule by mouth Daily.  Dispense: 30 capsule; Refill: 5    2. Alcohol use disorder  Continue to keep alcohol to a minimum.      3. Anxiety  4. Insomnia, unspecified type  Continue amitriptyline 25 mg nightly w/ melatonin.  If efficacy " decreases can consider increase to 50 mg nightly but if not efficacious at that dose would consider something else.       Follow Up     Return in about 6 months (around 11/27/2025) for Annual physical.

## 2025-06-20 ENCOUNTER — TELEPHONE (OUTPATIENT)
Dept: FAMILY MEDICINE CLINIC | Facility: CLINIC | Age: 30
End: 2025-06-20
Payer: COMMERCIAL

## 2025-06-20 NOTE — TELEPHONE ENCOUNTER
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 I COPIED HIS RESULTS FROM DOT PHYSICAL AND ATTACHED THEM TO THE CLEARANCE FORM FOR REVIEW. THEY WERE PLACED IN MA BOX.